# Patient Record
Sex: MALE | URBAN - METROPOLITAN AREA
[De-identification: names, ages, dates, MRNs, and addresses within clinical notes are randomized per-mention and may not be internally consistent; named-entity substitution may affect disease eponyms.]

---

## 2020-12-14 ENCOUNTER — OFFICE VISIT (OUTPATIENT)
Dept: URBAN - METROPOLITAN AREA CLINIC 54 | Facility: CLINIC | Age: 65
End: 2020-12-14
Payer: COMMERCIAL

## 2020-12-14 PROCEDURE — 92134 CPTRZ OPH DX IMG PST SGM RTA: CPT | Performed by: OPHTHALMOLOGY

## 2020-12-14 PROCEDURE — 92014 COMPRE OPH EXAM EST PT 1/>: CPT | Performed by: OPHTHALMOLOGY

## 2020-12-14 ASSESSMENT — INTRAOCULAR PRESSURE
OD: 20
OS: 19

## 2020-12-14 NOTE — IMPRESSION/PLAN
Impression: Exudative age-rel mclr degn, right eye, with inactive scar: H35.0477. Plan: Last treated with an Avastin injection OD on 6/30/20 for recurrent CNV OD. Exam/OCT reveals no IRF/SRF OD. No further treatment recommended at this time. Will continue to monitor. No treatment done today. 

Return in 12 weeks, OCT OU, re-eval Avastin OD

## 2020-12-14 NOTE — IMPRESSION/PLAN
Impression: Vitreous degeneration, right eye: H43.811. OD. Plan: Chronic PVD with no retinal tear or retinal detachment.

## 2021-03-08 ENCOUNTER — OFFICE VISIT (OUTPATIENT)
Dept: URBAN - METROPOLITAN AREA CLINIC 54 | Facility: CLINIC | Age: 66
End: 2021-03-08
Payer: COMMERCIAL

## 2021-03-08 DIAGNOSIS — H35.3212 EXUDATIVE AGE-RELATED MACULAR DEGENERATION, RIGHT EYE, WITH INACTIVE CHOROIDAL NEOVASCULARIZATION: Primary | ICD-10-CM

## 2021-03-08 DIAGNOSIS — H35.3213 EXUDATIVE AGE-REL MCLR DEGN, RIGHT EYE, WITH INACTIVE SCAR: ICD-10-CM

## 2021-03-08 PROCEDURE — 92014 COMPRE OPH EXAM EST PT 1/>: CPT | Performed by: OPHTHALMOLOGY

## 2021-03-08 PROCEDURE — 92134 CPTRZ OPH DX IMG PST SGM RTA: CPT | Performed by: OPHTHALMOLOGY

## 2021-03-08 ASSESSMENT — INTRAOCULAR PRESSURE
OD: 19
OS: 15

## 2021-03-08 NOTE — IMPRESSION/PLAN
Impression: Exudative age-rel mclr degn, right eye, with inactive scar: H35.6673. Plan: Last treated with an Avastin injection OD on 6/30/20 for recurrent CNV OD. Exam/OCT reveals no IRF/SRF OD. No further treatment recommended at this time. Will continue to monitor. No treatment done today. 

Return in 12 weeks, OCT OU, re-eval Avastin OD

## 2021-03-08 NOTE — IMPRESSION/PLAN
Impression: Nonexudative age-related macular degeneration of left eye, intermediate dry stage: H35.3122. Plan: Exam/OCT reveals no evidence of CNV. Recommend AREDS formula supplement and amsler grid monitoring.

## 2021-06-11 ENCOUNTER — OFFICE VISIT (OUTPATIENT)
Dept: URBAN - METROPOLITAN AREA CLINIC 54 | Facility: CLINIC | Age: 66
End: 2021-06-11
Payer: COMMERCIAL

## 2021-06-11 DIAGNOSIS — H35.3122 NONEXUDATIVE AGE-RELATED MACULAR DEGENERATION OF LEFT EYE, INTERMEDIATE DRY STAGE: ICD-10-CM

## 2021-06-11 DIAGNOSIS — H26.9 CATARACT: ICD-10-CM

## 2021-06-11 DIAGNOSIS — H43.811 VITREOUS DEGENERATION, RIGHT EYE: ICD-10-CM

## 2021-06-11 PROCEDURE — 99214 OFFICE O/P EST MOD 30 MIN: CPT | Performed by: OPHTHALMOLOGY

## 2021-06-11 PROCEDURE — 92134 CPTRZ OPH DX IMG PST SGM RTA: CPT | Performed by: OPHTHALMOLOGY

## 2021-06-11 ASSESSMENT — INTRAOCULAR PRESSURE
OD: 21
OS: 18

## 2021-06-11 NOTE — IMPRESSION/PLAN
Impression: Exudative age-rel mclr degn, right eye, with inactive scar: H35.1333. Plan: Last treated with an Avastin injection OD on 6/30/20 for recurrent CNV OD. Exam/OCT reveals no IRF/SRF OD. No further treatment recommended at this time. No treatment done today. 

Return PRN, OCT OU, re-eval Avastin OD